# Patient Record
Sex: MALE | Race: OTHER | Employment: STUDENT | URBAN - METROPOLITAN AREA
[De-identification: names, ages, dates, MRNs, and addresses within clinical notes are randomized per-mention and may not be internally consistent; named-entity substitution may affect disease eponyms.]

---
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
## 2023-04-21 ENCOUNTER — APPOINTMENT (OUTPATIENT)
Dept: GENERAL RADIOLOGY | Age: 23
End: 2023-04-21
Attending: EMERGENCY MEDICINE
Payer: COMMERCIAL

## 2023-04-21 ENCOUNTER — HOSPITAL ENCOUNTER (EMERGENCY)
Age: 23
Discharge: HOME | End: 2023-04-21
Attending: EMERGENCY MEDICINE
Payer: COMMERCIAL

## 2023-04-21 VITALS
WEIGHT: 200 LBS | TEMPERATURE: 99 F | DIASTOLIC BLOOD PRESSURE: 57 MMHG | HEIGHT: 65 IN | RESPIRATION RATE: 18 BRPM | HEART RATE: 93 BPM | OXYGEN SATURATION: 98 % | BODY MASS INDEX: 33.32 KG/M2 | SYSTOLIC BLOOD PRESSURE: 150 MMHG

## 2023-04-21 DIAGNOSIS — E87.6 HYPOKALEMIA: ICD-10-CM

## 2023-04-21 DIAGNOSIS — R05.9 COUGH, UNSPECIFIED TYPE: ICD-10-CM

## 2023-04-21 DIAGNOSIS — R07.89 CHEST PAIN, ATYPICAL: Primary | ICD-10-CM

## 2023-04-21 DIAGNOSIS — F41.9 ANXIETY: ICD-10-CM

## 2023-04-21 LAB
ANION GAP SERPL CALC-SCNC: 9 MMOL/L (ref 0–18)
ATRIAL RATE: 99 BPM
BASOPHILS # BLD AUTO: 0.04 X10(3) UL (ref 0–0.2)
BASOPHILS NFR BLD AUTO: 0.4 %
BUN BLD-MCNC: 13 MG/DL (ref 7–18)
CALCIUM BLD-MCNC: 10.1 MG/DL (ref 8.5–10.1)
CHLORIDE SERPL-SCNC: 105 MMOL/L (ref 98–112)
CO2 SERPL-SCNC: 25 MMOL/L (ref 21–32)
CREAT BLD-MCNC: 0.8 MG/DL
EOSINOPHIL # BLD AUTO: 0.04 X10(3) UL (ref 0–0.7)
EOSINOPHIL NFR BLD AUTO: 0.4 %
ERYTHROCYTE [DISTWIDTH] IN BLOOD BY AUTOMATED COUNT: 13.4 %
GFR SERPLBLD BASED ON 1.73 SQ M-ARVRAT: 130 ML/MIN/1.73M2 (ref 60–?)
GLUCOSE BLD-MCNC: 105 MG/DL (ref 70–99)
HCT VFR BLD AUTO: 45.2 %
HGB BLD-MCNC: 15.4 G/DL
IMM GRANULOCYTES # BLD AUTO: 0.02 X10(3) UL (ref 0–1)
IMM GRANULOCYTES NFR BLD: 0.2 %
LYMPHOCYTES # BLD AUTO: 2.71 X10(3) UL (ref 1–4)
LYMPHOCYTES NFR BLD AUTO: 28.5 %
MCH RBC QN AUTO: 26.3 PG (ref 26–34)
MCHC RBC AUTO-ENTMCNC: 34.1 G/DL (ref 31–37)
MCV RBC AUTO: 77.1 FL
MONOCYTES # BLD AUTO: 0.63 X10(3) UL (ref 0.1–1)
MONOCYTES NFR BLD AUTO: 6.6 %
NEUTROPHILS # BLD AUTO: 6.08 X10 (3) UL (ref 1.5–7.7)
NEUTROPHILS # BLD AUTO: 6.08 X10(3) UL (ref 1.5–7.7)
NEUTROPHILS NFR BLD AUTO: 63.9 %
OSMOLALITY SERPL CALC.SUM OF ELEC: 288 MOSM/KG (ref 275–295)
P AXIS: 45 DEGREES
P-R INTERVAL: 134 MS
PLATELET # BLD AUTO: 198 10(3)UL (ref 150–450)
POTASSIUM SERPL-SCNC: 3.2 MMOL/L (ref 3.5–5.1)
Q-T INTERVAL: 376 MS
QRS DURATION: 92 MS
QTC CALCULATION (BEZET): 482 MS
R AXIS: 77 DEGREES
RBC # BLD AUTO: 5.86 X10(6)UL
SARS-COV-2 RNA RESP QL NAA+PROBE: NOT DETECTED
SODIUM SERPL-SCNC: 139 MMOL/L (ref 136–145)
T AXIS: 58 DEGREES
TROPONIN I HIGH SENSITIVITY: 4 NG/L
VENTRICULAR RATE: 99 BPM
WBC # BLD AUTO: 9.5 X10(3) UL (ref 4–11)

## 2023-04-21 PROCEDURE — 99284 EMERGENCY DEPT VISIT MOD MDM: CPT

## 2023-04-21 PROCEDURE — 85025 COMPLETE CBC W/AUTO DIFF WBC: CPT | Performed by: EMERGENCY MEDICINE

## 2023-04-21 PROCEDURE — 36415 COLL VENOUS BLD VENIPUNCTURE: CPT

## 2023-04-21 PROCEDURE — 71046 X-RAY EXAM CHEST 2 VIEWS: CPT | Performed by: EMERGENCY MEDICINE

## 2023-04-21 PROCEDURE — 80048 BASIC METABOLIC PNL TOTAL CA: CPT | Performed by: EMERGENCY MEDICINE

## 2023-04-21 PROCEDURE — 84484 ASSAY OF TROPONIN QUANT: CPT | Performed by: EMERGENCY MEDICINE

## 2023-04-21 PROCEDURE — 93005 ELECTROCARDIOGRAM TRACING: CPT

## 2023-04-21 PROCEDURE — 93010 ELECTROCARDIOGRAM REPORT: CPT

## 2023-04-21 PROCEDURE — 99285 EMERGENCY DEPT VISIT HI MDM: CPT

## 2023-04-21 RX ORDER — POTASSIUM CHLORIDE 20 MEQ/1
40 TABLET, EXTENDED RELEASE ORAL ONCE
Status: DISCONTINUED | OUTPATIENT
Start: 2023-04-21 | End: 2023-04-21

## 2023-04-21 RX ORDER — POTASSIUM CHLORIDE 1.5 G/1.77G
40 POWDER, FOR SOLUTION ORAL ONCE
Status: COMPLETED | OUTPATIENT
Start: 2023-04-21 | End: 2023-04-21

## 2023-04-21 NOTE — ED INITIAL ASSESSMENT (HPI)
States nicole since Tuesday and dry cough. States worse when lie flat and feels like he cannot get a full breath. States he feels anxious used to have asthma but has not used inhaler for years.  Also states had had chest pain off and on for last 6 months and a headache since Tuesday with dizziness

## 2023-04-21 NOTE — ED QUICK NOTES
Unable to take po pills. Md informed and liquid potassium ordered. States his head ache comes and goes.  Prosper is \"better\" pulse ox 100%

## 2023-07-19 ENCOUNTER — HOSPITAL ENCOUNTER (OUTPATIENT)
Dept: ULTRASOUND IMAGING | Age: 23
Discharge: HOME | End: 2023-07-19
Attending: UROLOGY
Payer: COMMERCIAL

## 2023-07-19 DIAGNOSIS — N50.819 TESTICULAR PAIN: ICD-10-CM

## 2023-07-19 PROCEDURE — 76870 US EXAM SCROTUM: CPT | Performed by: UROLOGY

## 2023-07-19 PROCEDURE — 93975 VASCULAR STUDY: CPT | Performed by: UROLOGY

## 2023-12-18 ENCOUNTER — HOSPITAL ENCOUNTER (EMERGENCY)
Age: 23
Discharge: HOME | End: 2023-12-18
Payer: COMMERCIAL

## 2023-12-18 VITALS
TEMPERATURE: 100 F | OXYGEN SATURATION: 98 % | BODY MASS INDEX: 27.49 KG/M2 | SYSTOLIC BLOOD PRESSURE: 133 MMHG | WEIGHT: 165 LBS | DIASTOLIC BLOOD PRESSURE: 70 MMHG | HEIGHT: 65 IN | RESPIRATION RATE: 18 BRPM | HEART RATE: 89 BPM

## 2023-12-18 DIAGNOSIS — J10.1 INFLUENZA A: Primary | ICD-10-CM

## 2023-12-18 LAB
POCT INFLUENZA A: POSITIVE
POCT INFLUENZA B: NEGATIVE
SARS-COV-2 RNA RESP QL NAA+PROBE: NOT DETECTED

## 2023-12-18 PROCEDURE — 87502 INFLUENZA DNA AMP PROBE: CPT | Performed by: PHYSICIAN ASSISTANT

## 2023-12-18 PROCEDURE — 99284 EMERGENCY DEPT VISIT MOD MDM: CPT

## 2023-12-18 PROCEDURE — 99283 EMERGENCY DEPT VISIT LOW MDM: CPT

## 2023-12-18 RX ORDER — ACETAMINOPHEN 500 MG
1000 TABLET ORAL ONCE
Status: COMPLETED | OUTPATIENT
Start: 2023-12-18 | End: 2023-12-18

## 2023-12-18 NOTE — DISCHARGE INSTRUCTIONS
Over-the-counter cough and cold medication as needed for symptom management. Follow up with your primary doctor. If you have new, changing or worsening symptoms, please go directly to the ER.

## 2024-02-20 ENCOUNTER — APPOINTMENT (OUTPATIENT)
Dept: CT IMAGING | Age: 24
End: 2024-02-20
Attending: EMERGENCY MEDICINE
Payer: COMMERCIAL

## 2024-02-20 ENCOUNTER — HOSPITAL ENCOUNTER (EMERGENCY)
Age: 24
Discharge: HOME | End: 2024-02-20
Attending: EMERGENCY MEDICINE
Payer: COMMERCIAL

## 2024-02-20 VITALS
OXYGEN SATURATION: 100 % | SYSTOLIC BLOOD PRESSURE: 135 MMHG | RESPIRATION RATE: 17 BRPM | TEMPERATURE: 98 F | BODY MASS INDEX: 28.32 KG/M2 | HEIGHT: 65 IN | HEART RATE: 60 BPM | DIASTOLIC BLOOD PRESSURE: 83 MMHG | WEIGHT: 170 LBS

## 2024-02-20 DIAGNOSIS — R10.9 ABDOMINAL PAIN OF UNKNOWN ETIOLOGY: Primary | ICD-10-CM

## 2024-02-20 LAB
ALBUMIN SERPL-MCNC: 3.7 G/DL (ref 3.4–5)
ALBUMIN/GLOB SERPL: 1 {RATIO} (ref 1–2)
ALP LIVER SERPL-CCNC: 53 U/L
ALT SERPL-CCNC: 20 U/L
ANION GAP SERPL CALC-SCNC: 2 MMOL/L (ref 0–18)
AST SERPL-CCNC: 11 U/L (ref 15–37)
BASOPHILS # BLD AUTO: 0.03 X10(3) UL (ref 0–0.2)
BASOPHILS NFR BLD AUTO: 0.6 %
BILIRUB SERPL-MCNC: 0.6 MG/DL (ref 0.1–2)
BILIRUB UR QL STRIP.AUTO: NEGATIVE
BUN BLD-MCNC: 10 MG/DL (ref 9–23)
CALCIUM BLD-MCNC: 9.2 MG/DL (ref 8.5–10.1)
CHLORIDE SERPL-SCNC: 106 MMOL/L (ref 98–112)
CLARITY UR REFRACT.AUTO: CLEAR
CO2 SERPL-SCNC: 30 MMOL/L (ref 21–32)
COLOR UR AUTO: YELLOW
CREAT BLD-MCNC: 0.68 MG/DL
EGFRCR SERPLBLD CKD-EPI 2021: 136 ML/MIN/1.73M2 (ref 60–?)
EOSINOPHIL # BLD AUTO: 0.03 X10(3) UL (ref 0–0.7)
EOSINOPHIL NFR BLD AUTO: 0.6 %
ERYTHROCYTE [DISTWIDTH] IN BLOOD BY AUTOMATED COUNT: 13.6 %
GLOBULIN PLAS-MCNC: 3.7 G/DL (ref 2.8–4.4)
GLUCOSE BLD-MCNC: 90 MG/DL (ref 70–99)
GLUCOSE UR STRIP.AUTO-MCNC: NEGATIVE MG/DL
HCT VFR BLD AUTO: 42.5 %
HGB BLD-MCNC: 14.5 G/DL
IMM GRANULOCYTES # BLD AUTO: 0.02 X10(3) UL (ref 0–1)
IMM GRANULOCYTES NFR BLD: 0.4 %
KETONES UR STRIP.AUTO-MCNC: NEGATIVE MG/DL
LEUKOCYTE ESTERASE UR QL STRIP.AUTO: NEGATIVE
LIPASE SERPL-CCNC: 48 U/L (ref 13–75)
LYMPHOCYTES # BLD AUTO: 1.53 X10(3) UL (ref 1–4)
LYMPHOCYTES NFR BLD AUTO: 29.8 %
MCH RBC QN AUTO: 26.9 PG (ref 26–34)
MCHC RBC AUTO-ENTMCNC: 34.1 G/DL (ref 31–37)
MCV RBC AUTO: 78.7 FL
MONOCYTES # BLD AUTO: 0.39 X10(3) UL (ref 0.1–1)
MONOCYTES NFR BLD AUTO: 7.6 %
NEUTROPHILS # BLD AUTO: 3.14 X10 (3) UL (ref 1.5–7.7)
NEUTROPHILS # BLD AUTO: 3.14 X10(3) UL (ref 1.5–7.7)
NEUTROPHILS NFR BLD AUTO: 61 %
NITRITE UR QL STRIP.AUTO: NEGATIVE
OSMOLALITY SERPL CALC.SUM OF ELEC: 285 MOSM/KG (ref 275–295)
PH UR STRIP.AUTO: 7 [PH] (ref 5–8)
PLATELET # BLD AUTO: 148 10(3)UL (ref 150–450)
POTASSIUM SERPL-SCNC: 4.1 MMOL/L (ref 3.5–5.1)
PROT SERPL-MCNC: 7.4 G/DL (ref 6.4–8.2)
PROT UR STRIP.AUTO-MCNC: NEGATIVE MG/DL
RBC # BLD AUTO: 5.4 X10(6)UL
RBC UR QL AUTO: NEGATIVE
SODIUM SERPL-SCNC: 138 MMOL/L (ref 136–145)
SP GR UR STRIP.AUTO: 1.02 (ref 1–1.03)
UROBILINOGEN UR STRIP.AUTO-MCNC: 0.2 MG/DL
WBC # BLD AUTO: 5.1 X10(3) UL (ref 4–11)

## 2024-02-20 PROCEDURE — 96361 HYDRATE IV INFUSION ADD-ON: CPT

## 2024-02-20 PROCEDURE — 83690 ASSAY OF LIPASE: CPT | Performed by: EMERGENCY MEDICINE

## 2024-02-20 PROCEDURE — 99284 EMERGENCY DEPT VISIT MOD MDM: CPT

## 2024-02-20 PROCEDURE — 96374 THER/PROPH/DIAG INJ IV PUSH: CPT

## 2024-02-20 PROCEDURE — 74177 CT ABD & PELVIS W/CONTRAST: CPT | Performed by: EMERGENCY MEDICINE

## 2024-02-20 PROCEDURE — 99285 EMERGENCY DEPT VISIT HI MDM: CPT

## 2024-02-20 PROCEDURE — 81003 URINALYSIS AUTO W/O SCOPE: CPT | Performed by: EMERGENCY MEDICINE

## 2024-02-20 PROCEDURE — 85025 COMPLETE CBC W/AUTO DIFF WBC: CPT | Performed by: EMERGENCY MEDICINE

## 2024-02-20 PROCEDURE — 80053 COMPREHEN METABOLIC PANEL: CPT | Performed by: EMERGENCY MEDICINE

## 2024-02-20 RX ORDER — PANTOPRAZOLE SODIUM 40 MG/1
40 TABLET, DELAYED RELEASE ORAL DAILY
Qty: 30 TABLET | Refills: 0 | Status: SHIPPED | OUTPATIENT
Start: 2024-02-20 | End: 2024-03-21

## 2024-02-20 RX ORDER — ONDANSETRON 4 MG/1
4 TABLET, ORALLY DISINTEGRATING ORAL EVERY 4 HOURS PRN
Qty: 10 TABLET | Refills: 0 | Status: SHIPPED | OUTPATIENT
Start: 2024-02-20 | End: 2024-02-27

## 2024-02-20 RX ORDER — KETOROLAC TROMETHAMINE 15 MG/ML
15 INJECTION, SOLUTION INTRAMUSCULAR; INTRAVENOUS ONCE
Status: COMPLETED | OUTPATIENT
Start: 2024-02-20 | End: 2024-02-20

## 2024-02-20 RX ORDER — POLYETHYLENE GLYCOL 3350 17 G/17G
17 POWDER, FOR SOLUTION ORAL DAILY PRN
Qty: 12 EACH | Refills: 0 | Status: SHIPPED | OUTPATIENT
Start: 2024-02-20 | End: 2024-03-21

## 2024-02-20 NOTE — ED PROVIDER NOTES
Patient Seen in: [facility] In [place]      History     Chief Complaint   Patient presents with    Abdomen/Flank Pain     Stated Complaint: lower abd pain onset yesterday    Subjective:   HPI    This is a 23-year-old male who arrives here with complaints of abdominal pain abdominal pain started yesterday morning.  The patient describes his lower abdomen he states he did feel constipated yesterday.  He said no vomiting no fever he has had not taken some laxatives with little relief of symptoms he denies any diarrhea dysuria, fevers, chills.  He has had a previous history of a tonsillectomy but no other abdominal surgery.    The pain is described as lower abdomen is not aggravated relieved with anything.    Objective:   Past Medical History:   Diagnosis Date    Anxiety     Asthma (HCC)               Past Surgical History:   Procedure Laterality Date    TONSILLECTOMY                  Social History     Socioeconomic History    Marital status: Single   Tobacco Use    Smoking status: Never    Smokeless tobacco: Never   Vaping Use    Vaping Use: Never used   Substance and Sexual Activity    Alcohol use: Not Currently    Drug use: Not Currently              Review of Systems    Positive for stated complaint: lower abd pain onset yesterday  Other systems are as noted in HPI.  Constitutional and vital signs reviewed.      All other systems reviewed and negative except as noted above.    Physical Exam     ED Triage Vitals [02/20/24 1251]   /80   Pulse 79   Resp 16   Temp 98.1 °F (36.7 °C)   Temp src Temporal   SpO2 98 %   O2 Device None (Room air)       Current:/83   Pulse 60   Temp 97.6 °F (36.4 °C) (Oral)   Resp 17   Ht 165.1 cm (5' 5\")   Wt 77.1 kg   SpO2 100%   BMI 28.29 kg/m²         Physical Exam  General: Male no respiratory distress.  The patient is in no respiratory distress    HEENT: There is no signs of trauma.  Oral mucosa is wet.    Lungs: Clear to auscultation without  wheezing or retractions    Cardiovascular: Regular without murmurs  His abdomen is nondistended.  He is tender in the lower abdomen mild diffuse tenderness throughout the abdomen.  There is no rebound no CVA tenderness.  Extremities: Good pulses bilaterally.    No calf tenderness  Neuro: Alert and oriented.  The patient is moving all extremities there is no focal findings.  He is able to ambulate without any problems got normal strength and sensory exam.    ED Course     Labs Reviewed   COMP METABOLIC PANEL (14) - Abnormal; Notable for the following components:       Result Value    Creatinine 0.68 (*)     AST 11 (*)     All other components within normal limits   CBC W/ DIFFERENTIAL - Abnormal; Notable for the following components:    .0 (*)     MCV 78.7 (*)     All other components within normal limits   LIPASE - Normal   CBC WITH DIFFERENTIAL WITH PLATELET    Narrative:     The following orders were created for panel order CBC With Differential With Platelet.  Procedure                               Abnormality         Status                     ---------                               -----------         ------                     CBC W/ DIFFERENTIAL[[id number]]          Abnormal            Final result                 Please view results for these tests on the individual orders.   URINALYSIS, ROUTINE        This patient's comprehensive, urinalysis, lipase, CBC was grossly normal.    Patient was given Toradol, liter of fluid,   I did go back and reexamined him.  I personally reviewed the radiographs and my individual interpretation shows    No obvious intra-abdominal pathology including path no obstruction or perforation.    Also reviewed official report and it shows  ` No acute pathology noted there is splenomegaly noted.         MDM      I went back and talk to him at length.  He said he had been constipated      I reexamined his abdomen is completely soft nontender there is no masses no rebound.  I  discussed he feels this is constipation and he has no left upper quadrant tenderness discussed the spleen is slightly enlarged she has no complaints of sore throat does not remember having mono recently.  Discussed it could be just a nonspecific thing it should be followed by his own primary care physician I recommend over-the-counter stool softeners.  But I discussed symptoms that should be follow-up with his primary care physician and abnormal spleen.  I discussed he has increasing pain discomfort return here    I discussed with the patient that were seeing them  in a short period of time.  I discussed with them that there is always a possibility that things can change and a need reevaluation with their primary care physician as soon as possible.  I've also discussed with them that if the pain gets worse to return to the emergency room immediately.             This note was prepared using Dragon [employer] voice recognition dictation software. As a result errors may occur. When identified these errors have been corrected. While every attempt is made to correct errors during dictation discrepancies may still exist.  If there is any questions contact the dictating provider for further clarification       This note was prepared using Dragon [employer] voice recognition dictation software. As a result errors may occur. When identified these errors have been corrected. While every attempt is made to correct errors during dictation discrepancies may still exist.  If there is any questions contact the dictating provider for further clarification       Medical Decision Making      Disposition and Plan     Clinical Impression:  1. Abdominal pain of unknown etiology       2 incidental findings of splenomegaly.  Disposition:  Discharge  2/20/2024  3:52 pm    Follow-up:  Sonal Perrin MD  [address] [facility] [address]  [phone]    Follow up in 1 day(s)            Medications Prescribed:  Current Discharge  Medication List        START taking these medications    Details   ondansetron 4 MG Oral Tablet Dispersible Take 1 tablet (4 mg total) by mouth every 4 (four) hours as needed for Nausea.  Qty: 10 tablet, Refills: 0      pantoprazole 40 MG Oral Tab EC Take 1 tablet (40 mg total) by mouth daily.  Qty: 30 tablet, Refills: 0      polyethylene glycol, PEG 3350, 17 g Oral Powd Pack Take 17 g by mouth daily as needed.  Qty: 12 each, Refills: 0

## 2024-02-20 NOTE — DISCHARGE INSTRUCTIONS
Clear liquids slowly advance her diet take Motrin Tylenol for pain.  If you have increasing pain discomfort fevers or chills return to the emergency room.    You were seen in the emergency room in a limited time.  There is a possibility that although we do not see any acute process at this present time that things can change with time.  Is therefore imperative that you follow-up with primary care physician for close follow-up.  If there is any significant progression of your pain  or other symptoms you to return immediately to the emergency room.

## 2024-02-28 ENCOUNTER — ORDER TRANSCRIPTION (OUTPATIENT)
Dept: ADMINISTRATIVE | Facility: HOSPITAL | Age: 24
End: 2024-02-28

## 2024-02-28 DIAGNOSIS — Z82.5 FAMILY HISTORY OF ASTHMA AND OTHER CHRONIC LOWER RESPIRATORY DISEASES: Primary | ICD-10-CM

## 2024-03-12 ENCOUNTER — EKG ENCOUNTER (OUTPATIENT)
Dept: LAB | Facility: HOSPITAL | Age: 24
End: 2024-03-12
Attending: FAMILY MEDICINE
Payer: COMMERCIAL

## 2024-03-12 ENCOUNTER — HOSPITAL ENCOUNTER (OUTPATIENT)
Dept: RESPIRATORY THERAPY | Facility: HOSPITAL | Age: 24
Discharge: HOME | End: 2024-03-12
Attending: FAMILY MEDICINE
Payer: COMMERCIAL

## 2024-03-12 DIAGNOSIS — R00.9 ABNORMALITY OF HEART BEAT: Primary | ICD-10-CM

## 2024-03-12 DIAGNOSIS — Z82.5 FAMILY HISTORY OF ASTHMA AND OTHER CHRONIC LOWER RESPIRATORY DISEASES: ICD-10-CM

## 2024-03-12 LAB
ATRIAL RATE: 66 BPM
P AXIS: 34 DEGREES
P-R INTERVAL: 136 MS
Q-T INTERVAL: 376 MS
QRS DURATION: 96 MS
QTC CALCULATION (BEZET): 394 MS
R AXIS: 69 DEGREES
T AXIS: 55 DEGREES
VENTRICULAR RATE: 66 BPM

## 2024-03-12 PROCEDURE — 94060 EVALUATION OF WHEEZING: CPT

## 2024-03-12 PROCEDURE — 94726 PLETHYSMOGRAPHY LUNG VOLUMES: CPT

## 2024-03-12 PROCEDURE — 93005 ELECTROCARDIOGRAM TRACING: CPT

## 2024-03-12 PROCEDURE — 93010 ELECTROCARDIOGRAM REPORT: CPT | Performed by: INTERNAL MEDICINE

## 2024-03-12 PROCEDURE — 94729 DIFFUSING CAPACITY: CPT

## 2024-03-13 NOTE — PROCEDURES
Pulmonary Function Test Results     Impression: Mild obstructive lung disease (FEV1 3.84L, 97% of predicted). There is a significant bronchodilator response. There is air trapping and hyperinflation. Clinical correlation advised.    To view the full PFT go to the Procedures tab and select the PFT order and click on the hyperlink under \"Scans on Order\".

## 2024-04-28 ENCOUNTER — HOSPITAL ENCOUNTER (EMERGENCY)
Age: 24
Discharge: HOME | End: 2024-04-28
Attending: EMERGENCY MEDICINE
Payer: COMMERCIAL

## 2024-04-28 VITALS
WEIGHT: 170 LBS | DIASTOLIC BLOOD PRESSURE: 86 MMHG | BODY MASS INDEX: 28.32 KG/M2 | RESPIRATION RATE: 16 BRPM | TEMPERATURE: 98 F | HEART RATE: 77 BPM | OXYGEN SATURATION: 100 % | SYSTOLIC BLOOD PRESSURE: 143 MMHG | HEIGHT: 65 IN

## 2024-04-28 DIAGNOSIS — Z23 NEED FOR TDAP VACCINATION: ICD-10-CM

## 2024-04-28 DIAGNOSIS — T07.XXXA MULTIPLE ABRASIONS: Primary | ICD-10-CM

## 2024-04-28 PROCEDURE — 99283 EMERGENCY DEPT VISIT LOW MDM: CPT

## 2024-04-28 PROCEDURE — 90471 IMMUNIZATION ADMIN: CPT
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
## 2024-04-28 NOTE — DISCHARGE INSTRUCTIONS
Watch for redness or swelling as it is a sign of infection. Return to the ER if you have any further concerns.

## 2024-07-12 ENCOUNTER — HOSPITAL ENCOUNTER (EMERGENCY)
Age: 24
Discharge: HOME | End: 2024-07-12
Attending: EMERGENCY MEDICINE
Payer: COMMERCIAL

## 2024-07-12 VITALS
TEMPERATURE: 99 F | RESPIRATION RATE: 17 BRPM | WEIGHT: 170 LBS | DIASTOLIC BLOOD PRESSURE: 86 MMHG | HEIGHT: 65 IN | SYSTOLIC BLOOD PRESSURE: 136 MMHG | BODY MASS INDEX: 28.32 KG/M2 | HEART RATE: 54 BPM | OXYGEN SATURATION: 99 %

## 2024-07-12 DIAGNOSIS — T30.0 BURN: Primary | ICD-10-CM

## 2024-07-12 PROCEDURE — 16020 DRESS/DEBRID P-THICK BURN S: CPT

## 2024-07-12 PROCEDURE — 99283 EMERGENCY DEPT VISIT LOW MDM: CPT
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
## 2024-11-21 ENCOUNTER — HOSPITAL ENCOUNTER (EMERGENCY)
Age: 24
Discharge: HOME | End: 2024-11-21
Attending: EMERGENCY MEDICINE
Payer: COMMERCIAL

## 2024-11-21 VITALS
BODY MASS INDEX: 28.99 KG/M2 | WEIGHT: 174 LBS | TEMPERATURE: 99 F | SYSTOLIC BLOOD PRESSURE: 143 MMHG | HEART RATE: 71 BPM | RESPIRATION RATE: 16 BRPM | HEIGHT: 65 IN | DIASTOLIC BLOOD PRESSURE: 78 MMHG | OXYGEN SATURATION: 99 %
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
## 2024-11-21 DIAGNOSIS — S09.90XA MINOR HEAD INJURY WITHOUT LOSS OF CONSCIOUSNESS, INITIAL ENCOUNTER: ICD-10-CM

## 2024-11-21 PROCEDURE — 99283 EMERGENCY DEPT VISIT LOW MDM: CPT

## 2024-11-21 RX ORDER — NAPROXEN 500 MG/1
500 TABLET ORAL 2 TIMES DAILY PRN
Qty: 20 TABLET | Refills: 0 | Status: SHIPPED | OUTPATIENT
Start: 2024-11-21
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
## (undated) NOTE — ED AVS SNAPSHOT
[facility] in [address] [place] [address]    Phone:  [phone]    Fax:  [phone]           Blanton Juan   MRN: [id number]    Department:  [facility] in [place]   Date of Visit Si usted tiene algun problema con jain sequimiento, por favor llame a nuestro adminstrador de venkata al [phone]    Expect to receive an electronic request (by e-mail or text) to complete a self-assessment the day after your visit.   You may also receiv Alexander Maloney [address] (92 [facility]) Tiana [address] Miranda. (900 [facility]) [address]  ([address]) [address] [facility] MyChart Questions? Call [phone] for help. [employer] is NOT to be used for urgent needs. For medical emergencies, dial 911.

## (undated) NOTE — ED AVS SNAPSHOT
THE [facility] Emergency Department in [address] [facility]    Phone:  [phone]    Fax:  [phone]           Franny Martinez   MRN: [id number]    Department:  THE [facility] Emergency Department in [place]   Date of Visit IF THERE IS ANY CHANGE OR WORSENING OF YOUR CONDITION, CALL YOUR PRIMARY CARE PHYSICIAN AT ONCE OR RETURN IMMEDIATELY TO THE EMERGENCY DEPARTMENT.     If you have been prescribed any medication(s), please fill your prescription right away and begin taking t

## (undated) NOTE — ED AVS SNAPSHOT
José Luis Senior   MRN: [id number]    Department:  [facility] Department in [place]   Date of Visit:  9/10/2018           Disclosure     Insurance plans vary and the physician(s) referred by the ER may not be covered by your plan.  Please conta tell this physician (or your personal doctor if your instructions are to return to your personal doctor) about any new or lasting problems. The primary care or specialist physician will see patients referred from the [facility] Emergency Department.  Salvadore Skiff

## (undated) NOTE — ED AVS SNAPSHOT
Jennie Reyes   MRN: [id number]    Department:  THE [facility] Emergency Department in [place]   Date of Visit:  5/29/2019           Disclosure     Insurance plans vary and the physician(s) referred by the ER may not be covered by your plan.  Please conta tell this physician (or your personal doctor if your instructions are to return to your personal doctor) about any new or lasting problems. The primary care or specialist physician will see patients referred from the [facility] Emergency Department.  Claude Romano

## (undated) NOTE — ED AVS SNAPSHOT
THE [facility] Emergency Department in [address] [facility]    Phone:  [phone]    Fax:  [phone]           Kirsten Olszewski   MRN: [id number]    Department:  THE [facility] Emergency Department in [place]   Date of Visit IF THERE IS ANY CHANGE OR WORSENING OF YOUR CONDITION, CALL YOUR PRIMARY CARE PHYSICIAN AT ONCE OR RETURN IMMEDIATELY TO THE EMERGENCY DEPARTMENT.     If you have been prescribed any medication(s), please fill your prescription right away and begin taking t

## (undated) NOTE — LETTER
May 12, 2017    Patient: Jaimie Main   Date of Visit: 5/12/2017       To Whom It May Concern:    Jaimie Main was seen and treated in our emergency department on 5/12/2017.  He should not participate in gym/sports until sutures removed    If yo

## (undated) NOTE — ED AVS SNAPSHOT
[facility] in [address] [place] [address]    Phone:  [phone]    Fax:  [phone]           Geovanny Tran   MRN: [id number]    Department:  [facility] in [place]   Date of Visit from our patient liason soon after your visit. Also, some patients receive a detailed feedback survey mailed to them a week after the visit. If you receive this, we would really appreciate it if you could take the time to complete it. Thank you!       You [facility] [address] (68 [facility] [id number] 206 Nia Jones 139 ([address]) [facility] [address] 176 [facility]. ([address]) [facility]an[facility]